# Patient Record
Sex: FEMALE | Race: OTHER | Employment: FULL TIME | URBAN - METROPOLITAN AREA
[De-identification: names, ages, dates, MRNs, and addresses within clinical notes are randomized per-mention and may not be internally consistent; named-entity substitution may affect disease eponyms.]

---

## 2023-12-25 ENCOUNTER — HOSPITAL ENCOUNTER (EMERGENCY)
Facility: HOSPITAL | Age: 53
Discharge: HOME | End: 2023-12-25
Attending: EMERGENCY MEDICINE
Payer: COMMERCIAL

## 2023-12-25 ENCOUNTER — APPOINTMENT (EMERGENCY)
Dept: CT IMAGING | Facility: HOSPITAL | Age: 53
End: 2023-12-25
Payer: COMMERCIAL

## 2023-12-25 VITALS
SYSTOLIC BLOOD PRESSURE: 128 MMHG | RESPIRATION RATE: 18 BRPM | HEART RATE: 78 BPM | OXYGEN SATURATION: 97 % | DIASTOLIC BLOOD PRESSURE: 84 MMHG | WEIGHT: 174.9 LBS | BODY MASS INDEX: 34.16 KG/M2 | TEMPERATURE: 97.8 F

## 2023-12-25 DIAGNOSIS — L03.213 PRESEPTAL CELLULITIS OF LEFT EYE: Primary | ICD-10-CM

## 2023-12-25 DIAGNOSIS — J32.9 SINUSITIS: ICD-10-CM

## 2023-12-25 LAB
ALBUMIN SERPL BCP-MCNC: 4.2 G/DL (ref 3.5–5)
ALP SERPL-CCNC: 107 U/L (ref 34–104)
ALT SERPL W P-5'-P-CCNC: 14 U/L (ref 7–52)
ANION GAP SERPL CALCULATED.3IONS-SCNC: 6 MMOL/L
AST SERPL W P-5'-P-CCNC: 17 U/L (ref 13–39)
BASOPHILS # BLD AUTO: 0.06 THOUSANDS/ÂΜL (ref 0–0.1)
BASOPHILS NFR BLD AUTO: 1 % (ref 0–1)
BILIRUB SERPL-MCNC: 0.33 MG/DL (ref 0.2–1)
BUN SERPL-MCNC: 19 MG/DL (ref 5–25)
CALCIUM SERPL-MCNC: 9.5 MG/DL (ref 8.4–10.2)
CHLORIDE SERPL-SCNC: 103 MMOL/L (ref 96–108)
CO2 SERPL-SCNC: 31 MMOL/L (ref 21–32)
CREAT SERPL-MCNC: 1.07 MG/DL (ref 0.6–1.3)
EOSINOPHIL # BLD AUTO: 0.61 THOUSAND/ÂΜL (ref 0–0.61)
EOSINOPHIL NFR BLD AUTO: 6 % (ref 0–6)
ERYTHROCYTE [DISTWIDTH] IN BLOOD BY AUTOMATED COUNT: 13.1 % (ref 11.6–15.1)
GFR SERPL CREATININE-BSD FRML MDRD: 59 ML/MIN/1.73SQ M
GLUCOSE SERPL-MCNC: 148 MG/DL (ref 65–140)
HCT VFR BLD AUTO: 46.6 % (ref 34.8–46.1)
HGB BLD-MCNC: 14.8 G/DL (ref 11.5–15.4)
IMM GRANULOCYTES # BLD AUTO: 0.02 THOUSAND/UL (ref 0–0.2)
IMM GRANULOCYTES NFR BLD AUTO: 0 % (ref 0–2)
LYMPHOCYTES # BLD AUTO: 2.55 THOUSANDS/ÂΜL (ref 0.6–4.47)
LYMPHOCYTES NFR BLD AUTO: 26 % (ref 14–44)
MCH RBC QN AUTO: 29.7 PG (ref 26.8–34.3)
MCHC RBC AUTO-ENTMCNC: 31.8 G/DL (ref 31.4–37.4)
MCV RBC AUTO: 94 FL (ref 82–98)
MONOCYTES # BLD AUTO: 0.64 THOUSAND/ÂΜL (ref 0.17–1.22)
MONOCYTES NFR BLD AUTO: 7 % (ref 4–12)
NEUTROPHILS # BLD AUTO: 6.02 THOUSANDS/ÂΜL (ref 1.85–7.62)
NEUTS SEG NFR BLD AUTO: 60 % (ref 43–75)
NRBC BLD AUTO-RTO: 0 /100 WBCS
PLATELET # BLD AUTO: 334 THOUSANDS/UL (ref 149–390)
PMV BLD AUTO: 9.8 FL (ref 8.9–12.7)
POTASSIUM SERPL-SCNC: 3.8 MMOL/L (ref 3.5–5.3)
PROT SERPL-MCNC: 6.4 G/DL (ref 6.4–8.4)
RBC # BLD AUTO: 4.98 MILLION/UL (ref 3.81–5.12)
SODIUM SERPL-SCNC: 140 MMOL/L (ref 135–147)
WBC # BLD AUTO: 9.9 THOUSAND/UL (ref 4.31–10.16)

## 2023-12-25 PROCEDURE — 99285 EMERGENCY DEPT VISIT HI MDM: CPT

## 2023-12-25 PROCEDURE — 96375 TX/PRO/DX INJ NEW DRUG ADDON: CPT

## 2023-12-25 PROCEDURE — 96361 HYDRATE IV INFUSION ADD-ON: CPT

## 2023-12-25 PROCEDURE — 99283 EMERGENCY DEPT VISIT LOW MDM: CPT

## 2023-12-25 PROCEDURE — 70481 CT ORBIT/EAR/FOSSA W/DYE: CPT

## 2023-12-25 PROCEDURE — 80053 COMPREHEN METABOLIC PANEL: CPT

## 2023-12-25 PROCEDURE — 36415 COLL VENOUS BLD VENIPUNCTURE: CPT

## 2023-12-25 PROCEDURE — 96374 THER/PROPH/DIAG INJ IV PUSH: CPT

## 2023-12-25 PROCEDURE — G1004 CDSM NDSC: HCPCS

## 2023-12-25 PROCEDURE — 85025 COMPLETE CBC W/AUTO DIFF WBC: CPT

## 2023-12-25 RX ORDER — KETOROLAC TROMETHAMINE 30 MG/ML
15 INJECTION, SOLUTION INTRAMUSCULAR; INTRAVENOUS ONCE
Status: COMPLETED | OUTPATIENT
Start: 2023-12-25 | End: 2023-12-25

## 2023-12-25 RX ORDER — LORAZEPAM 2 MG/ML
1 INJECTION INTRAMUSCULAR ONCE
Status: COMPLETED | OUTPATIENT
Start: 2023-12-25 | End: 2023-12-25

## 2023-12-25 RX ORDER — CLINDAMYCIN HYDROCHLORIDE 300 MG/1
300 CAPSULE ORAL 3 TIMES DAILY
Qty: 21 CAPSULE | Refills: 0 | Status: SHIPPED | OUTPATIENT
Start: 2023-12-25 | End: 2024-01-01

## 2023-12-25 RX ORDER — AMOXICILLIN AND CLAVULANATE POTASSIUM 875; 125 MG/1; MG/1
1 TABLET, FILM COATED ORAL EVERY 12 HOURS SCHEDULED
Qty: 14 TABLET | Refills: 0 | Status: SHIPPED | OUTPATIENT
Start: 2023-12-25 | End: 2024-01-01

## 2023-12-25 RX ORDER — CLINDAMYCIN HYDROCHLORIDE 150 MG/1
300 CAPSULE ORAL ONCE
Status: COMPLETED | OUTPATIENT
Start: 2023-12-25 | End: 2023-12-25

## 2023-12-25 RX ORDER — AMOXICILLIN AND CLAVULANATE POTASSIUM 875; 125 MG/1; MG/1
1 TABLET, FILM COATED ORAL ONCE
Status: COMPLETED | OUTPATIENT
Start: 2023-12-25 | End: 2023-12-25

## 2023-12-25 RX ORDER — TETRACAINE HYDROCHLORIDE 5 MG/ML
1 SOLUTION OPHTHALMIC ONCE
Status: COMPLETED | OUTPATIENT
Start: 2023-12-25 | End: 2023-12-25

## 2023-12-25 RX ADMIN — SODIUM CHLORIDE 1000 ML: 0.9 INJECTION, SOLUTION INTRAVENOUS at 02:28

## 2023-12-25 RX ADMIN — LORAZEPAM 1 MG: 2 INJECTION INTRAMUSCULAR; INTRAVENOUS at 02:57

## 2023-12-25 RX ADMIN — IOHEXOL 100 ML: 350 INJECTION, SOLUTION INTRAVENOUS at 03:17

## 2023-12-25 RX ADMIN — AMOXICILLIN AND CLAVULANATE POTASSIUM 1 TABLET: 875; 125 TABLET, FILM COATED ORAL at 04:33

## 2023-12-25 RX ADMIN — CLINDAMYCIN HYDROCHLORIDE 300 MG: 150 CAPSULE ORAL at 04:33

## 2023-12-25 RX ADMIN — KETOROLAC TROMETHAMINE 15 MG: 30 INJECTION, SOLUTION INTRAMUSCULAR; INTRAVENOUS at 04:39

## 2023-12-25 RX ADMIN — TETRACAINE HYDROCHLORIDE 1 DROP: 5 SOLUTION OPHTHALMIC at 04:45

## 2023-12-25 RX ADMIN — FLUORESCEIN SODIUM 1 STRIP: 1 STRIP OPHTHALMIC at 04:45

## 2023-12-25 NOTE — ED PROVIDER NOTES
History  Chief Complaint   Patient presents with    Allergic Reaction     L eye swelling and redness that began about an hour ago - states it has been watering with some discharge - claims to have allergy to fruits and ate some watermelon and pineapple today     The patient is a 53-year-old female with a past medical history of chronic pansinusitis, nasal polyposis, allergic rhinitis, prediabetes, and hypothyroidism, who presents for evaluation of left eye swelling.  She reports several hours of left eye swelling and redness.  Associated symptoms include eye discharge that is predominantly watery, but occasionally yellow.  No blurred vision, eye pain, or fevers.  The patient does report significant nasal congestion and rhinorrhea recently due to her chronic sinusitis.  She also states that she has allergies to certain fruits and did eat fruit today.  Denies similar reaction in the past.      History provided by:  Patient and relative   used: Yes (Patient's son translated and provided portions of the history.)      Prior to Admission Medications   Prescriptions Last Dose Informant Patient Reported? Taking?   B Complex CAPS   Yes No   Sig: Take 1 capsule by mouth in the morning.   Diclofenac Sodium (VOLTAREN) 1 %   Yes No   Sig: Apply 1 application topically 4 (four) times a day   HYDROcodone-acetaminophen (NORCO) 5-325 mg per tablet   No No   Sig: Take 1 tablet by mouth every 4 (four) hours as needed for pain for up to 20 dosesMax Daily Amount: 6 tablets   Phenir-PE-Sod Sal-Caff Cit (COUGH/COLD MEDICINE PO)   Yes No   Sig: Take 30 mL by mouth as needed   calcium citrate-vitamin D (CITRACAL+D) 315-200 MG-UNIT per tablet   Yes No   Sig: Take 1 tablet by mouth in the morning.   calcium-vitamin D 250-100 MG-UNIT per tablet   Yes No   Sig: Take 1 tablet by mouth 2 (two) times a day   clobetasol (TEMOVATE) 0.05 % cream   Yes No   Sig: apply to affected area twice a day for 10 days   cyanocobalamin 1,000  mcg/mL   Yes No   Sig: Inject 1,000 mcg into a muscle every 30 (thirty) days   ergocalciferol (VITAMIN D2) 50,000 units   Yes No   Sig: Take 50,000 Units by mouth once a week   famotidine (PEPCID) 20 mg tablet   No No   Sig: Take 1 tablet (20 mg total) by mouth 2 (two) times a day   Patient not taking: No sig reported   fluticasone (FLONASE) 50 mcg/act nasal spray   No No   Si sprays into each nostril 2 (two) times a day   Patient not taking: No sig reported   fluticasone (FLONASE) 50 mcg/act nasal spray   No No   Si sprays into each nostril 2 (two) times a day   Patient not taking: No sig reported   fluticasone (FLONASE) 50 mcg/act nasal spray   No No   Sig: INSTILL 2 SPRAYS INTO EACH NOSTRIL IN THE MORNING AND EVENING   gabapentin (NEURONTIN) 100 mg capsule   Yes No   levothyroxine 50 mcg tablet   No No   Sig: take 1 tablet by mouth every morning ON AN EMPTY STOMACH   loratadine (CLARITIN) 10 mg tablet  Self Yes No   Sig: Take 10 mg by mouth daily as needed     loratadine (CLARITIN) 10 mg tablet   No No   Sig: Take 1 tablet (10 mg total) by mouth daily   Patient not taking: No sig reported   loratadine (CLARITIN) 10 mg tablet   No No   Sig: Take 1 tablet (10 mg total) by mouth in the morning.   melatonin 3 mg   Yes No   Sig: Take 3 mg by mouth daily at bedtime   montelukast (SINGULAIR) 10 mg tablet   Yes No   Sig: Take 10 mg by mouth daily at bedtime   Patient not taking: No sig reported   montelukast (SINGULAIR) 10 mg tablet   No No   Sig: Take 1 tablet (10 mg total) by mouth daily at bedtime   predniSONE 10 mg tablet   No No   Si tabls PO in morning  days 1-3, 4 tabls days 4-6, 3 tabls PO days 7-9, 2 tabls days 10-12, 1 tabl PO in morning  days 13-15   Patient not taking: No sig reported   predniSONE 10 mg tablet   No No   Sig: Take 5 tablets (50 mg total) by mouth daily after breakfast 5 tabls PO in morning  days 1-3, 4 tabl days 4-6, 3 tabls days 7-9, 2 tabls days 10-12, 1 tabl PO in morning  days  13-15   sodium chloride (OCEAN) 0.65 % nasal spray   No No   Si sprays into each nostril 60 minutes pre-procedure   thiamine (VITAMIN B1) 100 mg tablet   Yes No   Sig: Take 100 mg by mouth daily      Facility-Administered Medications: None       Past Medical History:   Diagnosis Date    Hypothyroidism     Obesity     Vitamin D deficiency        Past Surgical History:   Procedure Laterality Date     SECTION  01/10/2000    NASAL SINUS SURGERY      ID STRTCTC CPTR ASSTD PX EXTRADURAL CRANIAL N/A 2020    Procedure: FUNCTIONAL ENDOSCOPIC SINUS SURGERY (FESS) IMAGED GUIDED, MAXILLARY ANTROSTOMY, TOTAL ETHMOIDECTOMY, SPHENOIDOTOMY,FRONTAL SINUSOTOMY;  Surgeon: Jatin Mejia MD;  Location: [facility];  Service: ENT    TUBAL LIGATION Bilateral        Family History   Problem Relation Age of Onset    Prostate cancer Father     Hypertension Mother     Diabetes Mother     Heart disease Mother     No Known Problems Sister     No Known Problems Sister     No Known Problems Sister     No Known Problems Sister      I have reviewed and agree with the history as documented.    E-Cigarette/Vaping    E-Cigarette Use Never User      E-Cigarette/Vaping Substances    Nicotine No     THC No     CBD No     Flavoring No     Other No     Unknown No      Social History     Tobacco Use    Smoking status: Never    Smokeless tobacco: Never   Vaping Use    Vaping status: Never Used   Substance Use Topics    Alcohol use: Never    Drug use: Never       Review of Systems   Constitutional:  Negative for chills and fever.   HENT:  Positive for congestion, facial swelling and rhinorrhea. Negative for ear pain and sore throat.    Eyes:  Positive for discharge and redness. Negative for photophobia, pain, itching and visual disturbance.   Respiratory:  Negative for cough and shortness of breath.    Cardiovascular:  Negative for chest pain and palpitations.   Gastrointestinal:  Negative for abdominal pain and vomiting.   Genitourinary:   Negative for dysuria and hematuria.   Musculoskeletal:  Negative for arthralgias, back pain and myalgias.   Skin:  Negative for color change and rash.   Neurological:  Negative for seizures, syncope and headaches.   All other systems reviewed and are negative.      Physical Exam  Physical Exam  Vitals and nursing note reviewed.   Constitutional:       General: She is awake. She is not in acute distress.     Appearance: Normal appearance. She is well-developed.   HENT:      Head: Normocephalic and atraumatic. Left periorbital erythema present. No right periorbital erythema.      Right Ear: External ear normal.      Left Ear: External ear normal.      Nose: Mucosal edema present.      Right Sinus: No maxillary sinus tenderness or frontal sinus tenderness.      Left Sinus: No maxillary sinus tenderness or frontal sinus tenderness.      Mouth/Throat:      Lips: Pink.      Mouth: Mucous membranes are moist.      Pharynx: Oropharynx is clear. Uvula midline.   Eyes:      General: Lids are normal. Vision grossly intact. Gaze aligned appropriately. No visual field deficit.        Right eye: No hordeolum.         Left eye: No hordeolum.      Extraocular Movements: Extraocular movements intact.      Conjunctiva/sclera:      Right eye: Right conjunctiva is not injected. No chemosis, exudate or hemorrhage.     Left eye: Left conjunctiva is injected. Exudate present. No hemorrhage.     Pupils: Pupils are equal, round, and reactive to light.      Left eye: No corneal abrasion or fluorescein uptake.      Slit lamp exam:     Left eye: Anterior chamber quiet. No corneal ulcer, foreign body or photophobia.      Comments: Mild to moderate left periorbital swelling and erythema.  The area is minimally tender to palpation.  The left conjunctiva is injected and there is a scant amount of mucopurulent discharge.   Cardiovascular:      Rate and Rhythm: Normal rate and regular rhythm.   Pulmonary:      Effort: Pulmonary effort is normal. No  respiratory distress.   Musculoskeletal:      Cervical back: Normal, full passive range of motion without pain and neck supple.      Thoracic back: Normal.      Lumbar back: Normal.   Lymphadenopathy:      Cervical: No cervical adenopathy.   Skin:     General: Skin is warm.      Capillary Refill: Capillary refill takes less than 2 seconds.      Coloration: Skin is not pale.      Findings: Erythema present. No rash.   Neurological:      Mental Status: She is alert and oriented to person, place, and time.   Psychiatric:         Behavior: Behavior is cooperative.         Vital Signs  ED Triage Vitals   Temperature Pulse Respirations Blood Pressure SpO2   12/25/23 0155 12/25/23 0155 12/25/23 0155 12/25/23 0155 12/25/23 0155   97.8 °F (36.6 °C) 91 18 152/93 97 %      Temp Source Heart Rate Source Patient Position - Orthostatic VS BP Location FiO2 (%)   12/25/23 0155 12/25/23 0155 12/25/23 0155 12/25/23 0155 --   Tympanic Monitor Lying Left arm       Pain Score       12/25/23 0439       8           Vitals:    12/25/23 0155 12/25/23 0432   BP: 152/93 128/84   Pulse: 91 78   Patient Position - Orthostatic VS: Lying Lying         ED Medications  Medications   sodium chloride 0.9 % bolus 1,000 mL (0 mL Intravenous Stopped 12/25/23 0328)   LORazepam (ATIVAN) injection 1 mg (1 mg Intravenous Given 12/25/23 0257)   iohexol (OMNIPAQUE) 350 MG/ML injection (MULTI-DOSE) 100 mL (100 mL Intravenous Given 12/25/23 0317)   clindamycin (CLEOCIN) capsule 300 mg (300 mg Oral Given 12/25/23 0433)   amoxicillin-clavulanate (AUGMENTIN) 875-125 mg per tablet 1 tablet (1 tablet Oral Given 12/25/23 0433)   ketorolac (TORADOL) injection 15 mg (15 mg Intravenous Given 12/25/23 0439)   fluorescein sodium sterile ophthalmic strip 1 strip (1 strip Left Eye Given by Other 12/25/23 0445)   tetracaine 0.5 % ophthalmic solution 1 drop (1 drop Left Eye Given by Other 12/25/23 0445)       Diagnostic Studies  Results Reviewed       Procedure Component  Value Units Date/Time    Comprehensive metabolic panel [[id number]]  (Abnormal) Collected: 12/25/23 0227    Lab Status: Final result Specimen: Blood from Arm, Right Updated: 12/25/23 0251     Sodium 140 mmol/L      Potassium 3.8 mmol/L      Chloride 103 mmol/L      CO2 31 mmol/L      ANION GAP 6 mmol/L      BUN 19 mg/dL      Creatinine 1.07 mg/dL      Glucose 148 mg/dL      Calcium 9.5 mg/dL      AST 17 U/L      ALT 14 U/L      Alkaline Phosphatase 107 U/L      Total Protein 6.4 g/dL      Albumin 4.2 g/dL      Total Bilirubin 0.33 mg/dL      eGFR 59 ml/min/1.73sq m     Narrative:      National [employer] guidelines for Chronic Kidney Disease (CKD):     Stage 1 with normal or high GFR (GFR > 90 mL/min/1.73 square meters)    Stage 2 Mild CKD (GFR = 60-89 mL/min/1.73 square meters)    Stage 3A Moderate CKD (GFR = 45-59 mL/min/1.73 square meters)    Stage 3B Moderate CKD (GFR = 30-44 mL/min/1.73 square meters)    Stage 4 Severe CKD (GFR = 15-29 mL/min/1.73 square meters)    Stage 5 End Stage CKD (GFR <15 mL/min/1.73 square meters)  Note: GFR calculation is accurate only with a steady state creatinine    CBC and differential [[id number]]  (Abnormal) Collected: 12/25/23 0227    Lab Status: Final result Specimen: Blood from Arm, Right Updated: 12/25/23 0235     WBC 9.90 Thousand/uL      RBC 4.98 Million/uL      Hemoglobin 14.8 g/dL      Hematocrit 46.6 %      MCV 94 fL      MCH 29.7 pg      MCHC 31.8 g/dL      RDW 13.1 %      MPV 9.8 fL      Platelets 334 Thousands/uL      nRBC 0 /100 WBCs      Neutrophils Relative 60 %      Immat GRANS % 0 %      Lymphocytes Relative 26 %      Monocytes Relative 7 %      Eosinophils Relative 6 %      Basophils Relative 1 %      Neutrophils Absolute 6.02 Thousands/µL      Immature Grans Absolute 0.02 Thousand/uL      Lymphocytes Absolute 2.55 Thousands/µL      Monocytes Absolute 0.64 Thousand/µL      Eosinophils Absolute 0.61 Thousand/µL      Basophils Absolute 0.06  Thousands/µL                    CT orbits/temporal bones/skull base w contrast   Final Result by E. Alec Schoenberger, MD (12/25 0513)      No postseptal inflammatory changes.   Mild left preseptal soft tissue edema, correlate for cellulitis. No abscess.   Worsening pansinus mucosal thickening.      Findings concurrent with preliminary report provided by [facility] at 4:17 a.m.      Workstation performed: [id number]                    Procedures  Procedures         ED Course  ED Course as of 12/25/23 0600   Mon Dec 25, 2023   0235 WBC: 9.90   0421 Preliminary CT Read per Dr. [facility]:  No evidence of post septal cellulitis or collection.  Severe paranasal sinus mucosal thickening.           Medical Decision Making  Patient presents with left eye swelling and redness.  On exam there is erythema, swelling, and mild tenderness of the left periorbital region.  EOMI and there is no pain with eye movement.  Vision is intact.  No abnormalities visualized with fluorescein staining.  Differential diagnosis includes but is not limited to preseptal cellulitis, postseptal/orbital cellulitis, conjunctivitis, iritis, uveitis, corneal foreign body, corneal abrasion, or allergic reaction.  CT of the abdomen and pelvis revealed mild left preseptal soft tissue swelling, consistent with cellulitis, but no evidence of postseptal cellulitis.  Will treat with a course of clindamycin and Augmentin.  Reviewed results with the patient and all questions were answered to the best my ability.  Strict return precautions discussed and she verbalized understanding.  Follow-up with PCP and ophthalmology, but return to the ED in the interim with new or worsening symptoms.        Problems Addressed:  Preseptal cellulitis of left eye: acute illness or injury  Sinusitis: acute illness or injury    Amount and/or Complexity of Data Reviewed  External Data Reviewed: notes.  Labs: ordered. Decision-making details documented in ED  Course.  Radiology: ordered.    Risk  Prescription drug management.             Disposition  Final diagnoses:   Preseptal cellulitis of left eye   Sinusitis     Time reflects when diagnosis was documented in both MDM as applicable and the Disposition within this note       Time User Action Codes Description Comment    12/25/2023  4:20 AM Sarah Roman [L03.213] Preseptal cellulitis of left eye     12/25/2023  4:24 AM Sarah Roman [J32.9] Sinusitis           ED Disposition       ED Disposition   Discharge    Condition   Stable    Date/Time   Mon Dec 25, 2023  4:20 AM    Comment   Dinorah Sheriff discharge to home/self care.                   Follow-up Information       Follow up With Specialties Details Why Contact Info    [facility]   [address]  [address]  [place] [address]  [phone]      Your primary care provider    [facility]   [address]   [place] [id number] [phone]            Discharge Medication List as of 12/25/2023  4:25 AM        START taking these medications    Details   amoxicillin-clavulanate (AUGMENTIN) 875-125 mg per tablet Take 1 tablet by mouth every 12 (twelve) hours for 7 days, Starting Mon 12/25/2023, Until Mon 1/1/2024, Print      clindamycin (CLEOCIN) 300 MG capsule Take 1 capsule (300 mg total) by mouth 3 (three) times a day for 7 days, Starting Mon 12/25/2023, Until Mon 1/1/2024, Print           CONTINUE these medications which have NOT CHANGED    Details   B Complex CAPS Take 1 capsule by mouth in the morning., Starting Wed 3/23/2022, Historical Med      calcium citrate-vitamin D (CITRACAL+D) 315-200 MG-UNIT per tablet Take 1 tablet by mouth in the morning., Starting Thu 3/24/2022, Historical Med      calcium-vitamin D 250-100 MG-UNIT per tablet Take 1 tablet by mouth 2 (two) times a day, Historical Med      clobetasol (TEMOVATE) 0.05 % cream apply to affected area twice a day for 10 days, Historical Med       cyanocobalamin 1,000 mcg/mL Inject 1,000 mcg into a muscle every 30 (thirty) days, Starting Thu 3/24/2022, Until Tue 9/20/2022, Historical Med      Diclofenac Sodium (VOLTAREN) 1 % Apply 1 application topically 4 (four) times a day, Starting Tue 9/7/2021, Until Wed 9/7/2022, Historical Med      ergocalciferol (VITAMIN D2) 50,000 units Take 50,000 Units by mouth once a week, Starting Wed 3/23/2022, Historical Med      famotidine (PEPCID) 20 mg tablet Take 1 tablet (20 mg total) by mouth 2 (two) times a day, Starting Thu 11/5/2020, Normal      !! fluticasone (FLONASE) 50 mcg/act nasal spray 2 sprays into each nostril 2 (two) times a day, Starting Thu 8/20/2020, Normal      !! fluticasone (FLONASE) 50 mcg/act nasal spray 2 sprays into each nostril 2 (two) times a day, Starting Thu 2/11/2021, Normal      !! fluticasone (FLONASE) 50 mcg/act nasal spray INSTILL 2 SPRAYS INTO EACH NOSTRIL IN THE MORNING AND EVENING, Normal      gabapentin (NEURONTIN) 100 mg capsule Starting Mon 5/16/2022, Historical Med      HYDROcodone-acetaminophen (NORCO) 5-325 mg per tablet Take 1 tablet by mouth every 4 (four) hours as needed for pain for up to 20 dosesMax Daily Amount: 6 tablets, Starting Mon 1/27/2020, Normal      levothyroxine 50 mcg tablet take 1 tablet by mouth every morning ON AN EMPTY STOMACH, Normal      !! loratadine (CLARITIN) 10 mg tablet Take 10 mg by mouth daily as needed  , Starting Tue 2/14/2017, Historical Med      !! loratadine (CLARITIN) 10 mg tablet Take 1 tablet (10 mg total) by mouth daily, Starting Thu 8/20/2020, Normal      !! loratadine (CLARITIN) 10 mg tablet Take 1 tablet (10 mg total) by mouth in the morning., Starting Thu 5/19/2022, Normal      melatonin 3 mg Take 3 mg by mouth daily at bedtime, Starting Wed 3/23/2022, Historical Med      !! montelukast (SINGULAIR) 10 mg tablet Take 10 mg by mouth daily at bedtime, Historical Med      !! montelukast (SINGULAIR) 10 mg tablet Take 1 tablet (10 mg total) by  mouth daily at bedtime, Starting Thu 5/19/2022, Normal      Phenir-PE-Sod Sal-Caff Cit (COUGH/COLD MEDICINE PO) Take 30 mL by mouth as needed, Historical Med      !! predniSONE 10 mg tablet 5 tabls PO in morning  days 1-3, 4 tabls days 4-6, 3 tabls PO days 7-9, 2 tabls days 10-12, 1 tabl PO in morning  days 13-15, Normal      !! predniSONE 10 mg tablet Take 5 tablets (50 mg total) by mouth daily after breakfast 5 tabls PO in morning  days 1-3, 4 tabl days 4-6, 3 tabls days 7-9, 2 tabls days 10-12, 1 tabl PO in morning  days 13-15, Starting Thu 5/19/2022, Normal      sodium chloride (OCEAN) 0.65 % nasal spray 2 sprays into each nostril 60 minutes pre-procedure, Starting Mon 1/27/2020, Until Wed 2/26/2020, Normal      thiamine (VITAMIN B1) 100 mg tablet Take 100 mg by mouth daily, Historical Med       !! - Potential duplicate medications found. Please discuss with provider.          No discharge procedures on file.    PDMP Review         Value Time User    PDMP Reviewed  Yes 1/27/2020 11:04 AM Jatin Mejia MD            ED Provider  Electronically Signed by             Sarah Roman PA-C  12/25/23 0794

## 2024-02-05 ENCOUNTER — TELEPHONE (OUTPATIENT)
Age: 54
End: 2024-02-05

## 2024-02-05 NOTE — TELEPHONE ENCOUNTER
Patient scheduled for 5/9/24 with Dr. Mejia to follow up nasal polyp. Patient states she cannot wait that long because she has difficulty breathing. The patient is not available until 1:00 p.m. or later.     Advised that we will place her on the cancellation list and will call as soon as we have an opening.

## 2024-03-14 ENCOUNTER — OFFICE VISIT (OUTPATIENT)
Dept: OTOLARYNGOLOGY | Facility: CLINIC | Age: 54
End: 2024-03-14
Payer: COMMERCIAL

## 2024-03-14 VITALS
TEMPERATURE: 98 F | WEIGHT: 174 LBS | HEART RATE: 80 BPM | HEIGHT: 62 IN | OXYGEN SATURATION: 97 % | BODY MASS INDEX: 32.02 KG/M2

## 2024-03-14 DIAGNOSIS — J33.9 NASAL POLYPOSIS: Primary | ICD-10-CM

## 2024-03-14 DIAGNOSIS — J32.4 CHRONIC PANSINUSITIS: ICD-10-CM

## 2024-03-14 DIAGNOSIS — R43.8 HYPOSMIA: ICD-10-CM

## 2024-03-14 DIAGNOSIS — J30.1 SEASONAL ALLERGIC RHINITIS DUE TO POLLEN: ICD-10-CM

## 2024-03-14 PROCEDURE — 99214 OFFICE O/P EST MOD 30 MIN: CPT | Performed by: OTOLARYNGOLOGY

## 2024-03-14 RX ORDER — PREDNISONE 10 MG/1
50 TABLET ORAL
Qty: 45 TABLET | Refills: 0 | Status: SHIPPED | OUTPATIENT
Start: 2024-03-14

## 2024-03-14 RX ORDER — SULFAMETHOXAZOLE AND TRIMETHOPRIM 800; 160 MG/1; MG/1
1 TABLET ORAL EVERY 12 HOURS SCHEDULED
Qty: 20 TABLET | Refills: 0 | Status: SHIPPED | OUTPATIENT
Start: 2024-03-14 | End: 2024-03-24

## 2024-03-14 RX ORDER — FLUTICASONE PROPIONATE 50 MCG
2 SPRAY, SUSPENSION (ML) NASAL 2 TIMES DAILY
Qty: 32 ML | Refills: 2 | Status: SHIPPED | OUTPATIENT
Start: 2024-03-14

## 2024-03-14 NOTE — PROGRESS NOTES
Dinorah Sheriff 53 y.o. female MRN: [id number]  Unit/Bed#:  Encounter: [id number]            History of Present Illness     Reason for Visit:: History of nasal polyposis/sinusitis    HPI: Dinorah Sheriff is a 53 y.o. year old female  status post functional endoscopic sinus surgery image guided all sinuses (17 and 2020).   Patient had initially recovered a fair amount of sense smell but 2022 visit had developed mild hyposmia.  I gave her a new treatment with oral antibiotic, oral steroid taper and instructed to continue the fluticasone b.i.d. and budesonide sinus rinses with partial recovery of sense of smell.  On last visit very small polyps were noticed on the roof of the ethmoid.  She was then instructed to continue the budesonide rinses, she has not followed since 2022.  Now again with nasal congestion and anosmia.  On 2023 she had apparently conjunctivitis and preseptal cellulitis, was seen in the emergency room and a CT scan of the orbits was done.  Left periorbital preseptal soft tissue edema was noticed, in addition to complete opacification of all the sinuses.  She did not tolerate the antibiotic that was prescribed orally, was treated only with eyedrops    Review of Systems   All other systems reviewed and are negative.    Revision of Systems:    Complete review done, only positive for the symptoms described in the H&P section above      Historical Information   Past Medical History:   Diagnosis Date    Hypothyroidism     Obesity     Vitamin D deficiency      Past Surgical History:   Procedure Laterality Date     SECTION  01/10/2000    NASAL SINUS SURGERY      [facility] CPTR ASSTD PX EXTRADURAL CRANIAL N/A 2020    Procedure: FUNCTIONAL ENDOSCOPIC SINUS SURGERY (FESS) IMAGED GUIDED, MAXILLARY ANTROSTOMY, TOTAL ETHMOIDECTOMY, SPHENOIDOTOMY,FRONTAL SINUSOTOMY;  Surgeon: Jatin Mejia MD;  Location: [facility];  Service: ENT    TUBAL LIGATION Bilateral   "    Social History   Social History     Substance and Sexual Activity   Alcohol Use Never     Social History     Substance and Sexual Activity   Drug Use Never     Social History     Tobacco Use   Smoking Status Never   Smokeless Tobacco Never     Family History:   Family History   Problem Relation Age of Onset    Prostate cancer Father     Hypertension Mother     Diabetes Mother     Heart disease Mother     No Known Problems Sister     No Known Problems Sister     No Known Problems Sister     No Known Problems Sister        Meds/Allergies   No current facility-administered medications for this visit.         Allergies   Allergen Reactions    Cephalexin Rash       Objective       Physical Exam   Pulse 80, temperature 98 °F (36.7 °C), temperature source Temporal, height 5' 2\" (1.575 m), weight 78.9 kg (174 lb), SpO2 97%, not currently breastfeeding.  Constitutional: Oriented to person, place, and time. Well-developed and well-nourished, no apparent distress, non-toxic appearance. Cooperative, able to hear and answer questions without difficulty.      Voice: Normal voice quality.  Head: Normocephalic, atraumatic.  No scars, masses or lesions.  Face: Symmetric, no edema, no sinus tenderness.  Eyes: Vision grossly intact, extra-ocular movement intact.  Right Ear: External ear normal.  Auditory canal clear.  Tympanic membrane well-appearing, without retraction or scarring.  No fluid present. No post-auricular erythema or tenderness  Left Ear: External ear normal.  Auditory canal clear.  Tympanic membrane well-appearing, without retraction or scarring.  No fluid present.  No post-auricular erythema or tenderness.  Nose: Septum midline, Mucosa moist, turbinates normal size, no edema.  Polyps are evident, filling completely the middle meatus and partially extending into the nasal cavity bilaterally.    Oral cavity:  Lips normal, no mucosal lesions. Dentition intact, gingiva normal in appearance. Mucosa moist,  Tongue " "mobile, floor of mouth normal.  Hard palate unremarkable.  No masses or lesions.   Oropharynx: Uvula is midline, sot palate normal.  Unremarkable oropharyngeal inlet.  Tonsils unremarkable.  Posterior pharyngeal wall clear. No masses or lesions.  Salivary glands:  Parotid glands and submandibular glands symmetric, no enlargement or tenderness.  Neck: Normal laryngeal elevation with swallow.  Trachea midline.  No masses or lesions. No palpable adenopathy.  Thyroid: normal in size, and consistency, unremarkable without tenderness or palpable nodules.  Pulmonary/Chest: Normal effort and rate. No respiratory distress.   Musculoskeletal: Normal range of motion.   Neurological: Cranial nerves 2-12 intact.  Skin: Skin is warm and dry.   Psychiatric: Normal mood and affect.    CT scan orbits: 12/25/2023  Complete opacification of all the sinuses, nasal cavity still patent, there are evident changes of prior sinus surgery.  No erosion of the bone on the lamina Papyracea or base of skull.    Lab Results: CBC: No results found for: \"WBC\", \"HGB\", \"HCT\", \"MCV\", \"PLT\", \"ADJUSTEDWBC\", \"RBC\", \"MCH\", \"MCHC\", \"RDW\", \"MPV\", \"NRBC\", CMP: No results found for: \"NA\", \"K\", \"CL\", \"CO2\", \"ANIONGAP\", \"BUN\", \"CREATININE\", \"GLUCOSE\", \"CALCIUM\", \"AST\", \"ALT\", \"ALKPHOS\", \"PROT\", \"BILITOT\", \"EGFR\"      Assessment:  No diagnosis found.    Recurrence of polyps on patient with 2 prior surgeries, 2017 and 2020.  I did review with the patient the nature of the polyposis being it a very chronic condition that needs continued follow-up.  Will prescribe a steroid taper as well as restart on the budesonide rinses.  Will also prescribe Bactrim and Flonase.  Depending on response may need additional surgery this was also reviewed with the patient.  In addition he may eventually qualify for Dupixent or Nucala,   Of note she does have on the records allergy to cephalexin.  Most recently was giving Augmentin and she reports that she had very severe rash in the " skin with that antibiotic as well.    Request a CT scan of the sinuses posttreatment with image guided protocol.  The last CT scan was only orbits and I reviewed the images, they do not have the protocol needed for image guidance.

## 2024-03-20 ENCOUNTER — TELEPHONE (OUTPATIENT)
Age: 54
End: 2024-03-20

## 2024-03-20 NOTE — TELEPHONE ENCOUNTER
Patient called to schedule f/u with Dr. Mejia after her CT this Friday. The only thing available is September. Patient was upset and said she cannot wait that long because the doctor ordered this test as urgent. Please call the patient with an appointment asap. Thank you.

## 2024-03-22 ENCOUNTER — HOSPITAL ENCOUNTER (OUTPATIENT)
Dept: CT IMAGING | Facility: HOSPITAL | Age: 54
End: 2024-03-22
Attending: OTOLARYNGOLOGY
Payer: COMMERCIAL

## 2024-03-22 DIAGNOSIS — J32.4 CHRONIC PANSINUSITIS: ICD-10-CM

## 2024-03-22 DIAGNOSIS — J30.1 SEASONAL ALLERGIC RHINITIS DUE TO POLLEN: ICD-10-CM

## 2024-03-22 DIAGNOSIS — J33.9 NASAL POLYPOSIS: ICD-10-CM

## 2024-03-22 DIAGNOSIS — R43.8 HYPOSMIA: ICD-10-CM

## 2024-03-22 PROCEDURE — 70486 CT MAXILLOFACIAL W/O DYE: CPT

## 2024-04-05 ENCOUNTER — NURSE TRIAGE (OUTPATIENT)
Dept: OTOLARYNGOLOGY | Facility: CLINIC | Age: 54
End: 2024-04-05

## 2024-04-05 NOTE — TELEPHONE ENCOUNTER
"Pt of Dr. Mejia.    Pt calling -  RN connected  to call- [place] [id number].  Pt asking if she has a follow up appt scheduled to discuss her CT results.  RN advised that pt's appt is on 4/18/24 at 1130.  Pt verbalizes understanding and has no other questions or concerns.    Answer Assessment - Initial Assessment Questions  1. REASON FOR CALL or QUESTION: \"What is your reason for calling today?\" or \"How can I best help you?\" or \"What question do you have that I can help answer?\"      Pt calling to check if she has an appt.    Protocols used: Information Only Call - No Triage-ADULT-[place]    "

## 2024-04-18 ENCOUNTER — OFFICE VISIT (OUTPATIENT)
Dept: OTOLARYNGOLOGY | Facility: CLINIC | Age: 54
End: 2024-04-18
Payer: COMMERCIAL

## 2024-04-18 DIAGNOSIS — J33.9 NASAL POLYPOSIS: ICD-10-CM

## 2024-04-18 DIAGNOSIS — J30.1 SEASONAL ALLERGIC RHINITIS DUE TO POLLEN: ICD-10-CM

## 2024-04-18 DIAGNOSIS — J32.4 CHRONIC PANSINUSITIS: Primary | ICD-10-CM

## 2024-04-18 PROCEDURE — 99213 OFFICE O/P EST LOW 20 MIN: CPT | Performed by: OTOLARYNGOLOGY

## 2024-04-18 PROCEDURE — 31231 NASAL ENDOSCOPY DX: CPT | Performed by: OTOLARYNGOLOGY

## 2024-04-18 NOTE — PROGRESS NOTES
Dinorah Sheriff 53 y.o. female MRN: [id number]  Unit/Bed#:  Encounter: [id number]            History of Present Illness     Reason for Visit:: History of nasal polyposis/sinusitis    HPI: Dinorah Sheriff is a 53 y.o. year old female  status post functional endoscopic sinus surgery image guided all sinuses (1/19/17 and 01/27/2020).   Patient had initially recovered a fair amount of sense smell but 7/2022 visit had developed mild hyposmia.  I gave her a new treatment with oral antibiotic, oral steroid taper and instructed to continue the fluticasone b.i.d. and budesonide sinus rinses with partial recovery of sense of smell.  On last visit very small polyps were noticed on the roof of the ethmoid.  She was then instructed to continue the budesonide rinses.  she has not followed since 7/14/2022.  On 12/25/2023 she had apparently conjunctivitis and preseptal cellulitis, was seen in the emergency room and a CT scan of the orbits was done.  Left periorbital preseptal soft tissue edema was noticed, in addition to complete opacification of all the sinuses.  She did not tolerate the antibiotic that was prescribed orally, was treated only with eyedrops  She returned to our office on March 2024 with nasal congestion and anosmia, at that point she was noticed to have polyps on her nasal cavities.  I prescribed an oral steroid taper, a course of antibiotics and instructed patient to start budesonide rinses and continue nasal steroid spray.  She reports that with the treatment there was some improvement with recovery of sense of smell and less congestion.  A new CT scan was requested.        Review of Systems   All other systems reviewed and are negative.    Revision of Systems:    Complete review done, only positive for the symptoms described in the H&P section above      Historical Information   Past Medical History:   Diagnosis Date    Hypothyroidism     Obesity     Vitamin D deficiency      Past Surgical History:    Procedure Laterality Date     SECTION  01/10/2000    NASAL SINUS SURGERY      [facility] CPTR ASSTD PX EXTRADURAL CRANIAL N/A 2020    Procedure: FUNCTIONAL ENDOSCOPIC SINUS SURGERY (FESS) IMAGED GUIDED, MAXILLARY ANTROSTOMY, TOTAL ETHMOIDECTOMY, SPHENOIDOTOMY,FRONTAL SINUSOTOMY;  Surgeon: Jatin Mejia MD;  Location: [facility];  Service: ENT    TUBAL LIGATION Bilateral      Social History   Social History     Substance and Sexual Activity   Alcohol Use Never     Social History     Substance and Sexual Activity   Drug Use Never     Social History     Tobacco Use   Smoking Status Never   Smokeless Tobacco Never     Family History:   Family History   Problem Relation Age of Onset    Prostate cancer Father     Hypertension Mother     Diabetes Mother     Heart disease Mother     No Known Problems Sister     No Known Problems Sister     No Known Problems Sister     No Known Problems Sister        Meds/Allergies   No current facility-administered medications for this visit.         Allergies   Allergen Reactions    Cephalexin Rash       Objective       Physical Exam   not currently breastfeeding.  Constitutional: Oriented to person, place, and time. Well-developed and well-nourished, no apparent distress, non-toxic appearance. Cooperative, able to hear and answer questions without difficulty.      Voice: Normal voice quality.  Head: Normocephalic, atraumatic.  No scars, masses or lesions.  Face: Symmetric, no edema, no sinus tenderness.  Eyes: Vision grossly intact, extra-ocular movement intact.  Right Ear: External ear normal.  Auditory canal clear.  Tympanic membrane well-appearing, without retraction or scarring.  No fluid present. No post-auricular erythema or tenderness  Left Ear: External ear normal.  Auditory canal clear.  Tympanic membrane well-appearing, without retraction or scarring.  No fluid present.  No post-auricular erythema or tenderness.  Nose: Septum midline, Mucosa moist, turbinates  "normal size, no edema. No polyps are evident, no discharge   Oral cavity:  Lips normal, no mucosal lesions. Dentition intact, gingiva normal in appearance. Mucosa moist,  Tongue mobile, floor of mouth normal.  Hard palate unremarkable.  No masses or lesions.   Oropharynx: Uvula is midline, sot palate normal.  Unremarkable oropharyngeal inlet.  Tonsils unremarkable.  Posterior pharyngeal wall clear. No masses or lesions.  Salivary glands:  Parotid glands and submandibular glands symmetric, no enlargement or tenderness.  Neck: Normal laryngeal elevation with swallow.  Trachea midline.  No masses or lesions. No palpable adenopathy.  Thyroid: normal in size, and consistency, unremarkable without tenderness or palpable nodules.  Pulmonary/Chest: Normal effort and rate. No respiratory distress.   Musculoskeletal: Normal range of motion.   Neurological: Cranial nerves 2-12 intact.  Skin: Skin is warm and dry.   Psychiatric: Normal mood and affect.    CT scan orbits: 12/25/2023  Complete opacification of all the sinuses, nasal cavity still patent, there are evident changes of prior sinus surgery.  No erosion of the bone on the lamina Papyracea or base of skull.  CT scan sinuses 3/22/2024 (after steroid taper and antibiotic) status post sinus surgery with significant improvement compared to the CT scan from December 2023.  There is still mucosal thickening on maxillary sinuses, more polypoid tissue on the right side compared to the left, in a similar fashion the right frontal sinus has more polypoid edema or opacification near the outflow track as compared to the left frontal sinus.    Lab Results: CBC: No results found for: \"WBC\", \"HGB\", \"HCT\", \"MCV\", \"PLT\", \"ADJUSTEDWBC\", \"RBC\", \"MCH\", \"MCHC\", \"RDW\", \"MPV\", \"NRBC\", CMP: No results found for: \"NA\", \"K\", \"CL\", \"CO2\", \"ANIONGAP\", \"BUN\", \"CREATININE\", \"GLUCOSE\", \"CALCIUM\", \"AST\", \"ALT\", \"ALKPHOS\", \"PROT\", \"BILITOT\", \"EGFR\"    Nasal endoscopy:     Verbal informed consent " obtained  Topical anesthesia and vasoconstriction was applied via nasal spray bilaterally. Once anesthesia vasoconstriction had taken effect a flexible endoscope was advanced on both nasal cavities to the level of the nasopharynx with the findings described below:      The endoscope was then removed without complication patient tolerates procedure.  Endoscopy reveals nasal cavity is completely patent, endoscope was advanced on left middle meatus into the ethmoid cavity.  Most of the ethmoid cavity has relatively normal-appearing mucosa, there is some polypoid mucosa towards the medial side of the ethmoid.  Also some polypoid mucosa can be seen on the maxillary sinus.  No active discharge noticed at time of the exam.  Endoscope was then advanced on the right nasal cavity.  No polyps into the nasal cavity.  The endoscope was advanced behind the middle turbinate then the ethmoid is seen partially filled with multiple 4 mm polyps towards the roof most significantly.  There is no active discharge, no crusting, polyps also present at the area of the antrostomy as well as towards the frontal recess area.    Assessment:  No diagnosis found.    Recurrence of polyps on patient with 2 prior surgeries, 2017 and 2020.  I did review with the patient the nature of the polyposis being it a very chronic condition that needs continued follow-up.  Overall very good response both symptomatically and endoscopically with the steroid taper treatment.  On the last note I had mentioned budesonide rinses but apparently never called by the pharmacy.  She is only doing saline rinses.  Will prescribe Mometasone rinses.  Will also prescribe Bactrim and Flonase.  In addition she may eventually qualify for Dupixent or Nucala,   Of note she does have on the records allergy to cephalexin, with recent Augmentin she reports that having also severe rash in the skin

## 2024-05-27 ENCOUNTER — HOSPITAL ENCOUNTER (EMERGENCY)
Facility: HOSPITAL | Age: 54
Discharge: HOME | End: 2024-05-27
Attending: EMERGENCY MEDICINE
Payer: COMMERCIAL

## 2024-05-27 VITALS
TEMPERATURE: 98.1 F | BODY MASS INDEX: 31.59 KG/M2 | DIASTOLIC BLOOD PRESSURE: 88 MMHG | SYSTOLIC BLOOD PRESSURE: 158 MMHG | HEART RATE: 70 BPM | WEIGHT: 172.7 LBS | RESPIRATION RATE: 18 BRPM | OXYGEN SATURATION: 96 %

## 2024-05-27 DIAGNOSIS — H92.01 RIGHT EAR PAIN: Primary | ICD-10-CM

## 2024-05-27 PROCEDURE — 99283 EMERGENCY DEPT VISIT LOW MDM: CPT

## 2024-05-27 PROCEDURE — 99282 EMERGENCY DEPT VISIT SF MDM: CPT

## 2024-05-27 RX ORDER — LORATADINE 10 MG/1
10 TABLET ORAL DAILY
Qty: 20 TABLET | Refills: 0 | Status: SHIPPED | OUTPATIENT
Start: 2024-05-27

## 2024-05-27 RX ORDER — ACETAMINOPHEN 325 MG/1
650 TABLET ORAL ONCE
Status: COMPLETED | OUTPATIENT
Start: 2024-05-27 | End: 2024-05-27

## 2024-05-27 RX ADMIN — ACETAMINOPHEN 650 MG: 325 TABLET ORAL at 15:32

## 2024-05-27 NOTE — DISCHARGE INSTRUCTIONS
Continue using allergy medication daily as well as nose spray prescribed by ENT. Follow-up with ENT and PCP within the week. Return to ED for any worsening symptoms.

## 2024-05-27 NOTE — ED PROVIDER NOTES
History  Chief Complaint   Patient presents with    Earache     R ear pain 3 weeks has been using a saline nasal rinse for congestion     Patient is a 53-year-old female with a past medical history including hypothyroidism. Presents today for a chief complaint of right ear pain for 3 weeks. Was seen by ENT on 04/18 and was prescribed an oral steroid taper, an antibiotic, budesonide rinses,  and continue nasal steroid spray for nasal polyps. States that, for the last 3 weeks, she has the sensation that her ear is blocked and hearing is muffled. Denies any injury to ear. Denies any other symptoms. Per patient, she has stopped taking loratadine.       Earache  Associated symptoms: no fever        Prior to Admission Medications   Prescriptions Last Dose Informant Patient Reported? Taking?   B Complex CAPS   Yes No   Sig: Take 1 capsule by mouth in the morning.   Diclofenac Sodium (VOLTAREN) 1 %   Yes No   Sig: Apply 1 application topically 4 (four) times a day   HYDROcodone-acetaminophen (NORCO) 5-325 mg per tablet   No No   Sig: Take 1 tablet by mouth every 4 (four) hours as needed for pain for up to 20 dosesMax Daily Amount: 6 tablets   Phenir-PE-Sod Sal-Caff Cit (COUGH/COLD MEDICINE PO)   Yes No   Sig: Take 30 mL by mouth as needed   calcium citrate-vitamin D (CITRACAL+D) 315-200 MG-UNIT per tablet   Yes No   Sig: Take 1 tablet by mouth in the morning.   calcium-vitamin D 250-100 MG-UNIT per tablet   Yes No   Sig: Take 1 tablet by mouth 2 (two) times a day   clobetasol (TEMOVATE) 0.05 % cream   Yes No   Sig: apply to affected area twice a day for 10 days   cyanocobalamin 1,000 mcg/mL   Yes No   Sig: Inject 1,000 mcg into a muscle every 30 (thirty) days   ergocalciferol (VITAMIN D2) 50,000 units   Yes No   Sig: Take 50,000 Units by mouth once a week   famotidine (PEPCID) 20 mg tablet   No No   Sig: Take 1 tablet (20 mg total) by mouth 2 (two) times a day   Patient not taking: Reported on 2/11/2021   fluticasone  (FLONASE) 50 mcg/act nasal spray   No No   Si sprays into each nostril 2 (two) times a day   Patient not taking: Reported on 2022   fluticasone (FLONASE) 50 mcg/act nasal spray   No No   Si sprays into each nostril 2 (two) times a day   Patient not taking: Reported on 2022   fluticasone (FLONASE) 50 mcg/act nasal spray   No No   Sig: INSTILL 2 SPRAYS INTO EACH NOSTRIL IN THE MORNING AND EVENING   fluticasone (FLONASE) 50 mcg/act nasal spray   No No   Si sprays into each nostril 2 (two) times a day   gabapentin (NEURONTIN) 100 mg capsule   Yes No   levothyroxine 50 mcg tablet   No No   Sig: take 1 tablet by mouth every morning ON AN EMPTY STOMACH   loratadine (CLARITIN) 10 mg tablet  Self Yes No   Sig: Take 10 mg by mouth daily as needed     Patient not taking: Reported on 3/14/2024   loratadine (CLARITIN) 10 mg tablet   No No   Sig: Take 1 tablet (10 mg total) by mouth daily   Patient not taking: Reported on 2022   loratadine (CLARITIN) 10 mg tablet   No No   Sig: Take 1 tablet (10 mg total) by mouth in the morning.   melatonin 3 mg   Yes No   Sig: Take 3 mg by mouth daily at bedtime   montelukast (SINGULAIR) 10 mg tablet   Yes No   Sig: Take 10 mg by mouth daily at bedtime   Patient not taking: Reported on 2022   montelukast (SINGULAIR) 10 mg tablet   No No   Sig: Take 1 tablet (10 mg total) by mouth daily at bedtime   predniSONE 10 mg tablet   No No   Si tabls PO in morning  days 1-3, 4 tabls days 4-6, 3 tabls PO days 7-9, 2 tabls days 10-12, 1 tabl PO in morning  days -15   Patient not taking: Reported on 2021   predniSONE 10 mg tablet   No No   Sig: Take 5 tablets (50 mg total) by mouth daily after breakfast 5 tabls PO in morning  days 1-3, 4 tabl days 4-6, 3 tabls days 7-9, 2 tabls days 10-12, 1 tabl PO in morning  days 13-15   predniSONE 10 mg tablet   No No   Sig: Take 5 tablets (50 mg total) by mouth daily after breakfast 5 tabls PO in morning  days 1-3, 4 tabl days  4-6, 3 tabls days 7-9, 2 tabls days 10-12, 1 tabl PO in morning  days 13-15   sodium chloride (OCEAN) 0.65 % nasal spray   No No   Si sprays into each nostril 60 minutes pre-procedure   thiamine (VITAMIN B1) 100 mg tablet   Yes No   Sig: Take 100 mg by mouth daily      Facility-Administered Medications: None       Past Medical History:   Diagnosis Date    Hypothyroidism     Obesity     Vitamin D deficiency        Past Surgical History:   Procedure Laterality Date     SECTION  01/10/2000    NASAL SINUS SURGERY      [place] STRTCTC CPTR ASSTD PX EXTRADURAL CRANIAL N/A 2020    Procedure: FUNCTIONAL ENDOSCOPIC SINUS SURGERY (FESS) IMAGED GUIDED, MAXILLARY ANTROSTOMY, TOTAL ETHMOIDECTOMY, SPHENOIDOTOMY,FRONTAL SINUSOTOMY;  Surgeon: Jatin Mejia MD;  Location: [facility];  Service: ENT    TUBAL LIGATION Bilateral        Family History   Problem Relation Age of Onset    Prostate cancer Father     Hypertension Mother     Diabetes Mother     Heart disease Mother     No Known Problems Sister     No Known Problems Sister     No Known Problems Sister     No Known Problems Sister      I have reviewed and agree with the history as documented.    E-Cigarette/Vaping    E-Cigarette Use Never User      E-Cigarette/Vaping Substances    Nicotine No     THC No     CBD No     Flavoring No     Other No     Unknown No      Social History     Tobacco Use    Smoking status: Never    Smokeless tobacco: Never   Vaping Use    Vaping status: Never Used   Substance Use Topics    Alcohol use: Never    Drug use: Never       Review of Systems   Constitutional:  Negative for chills and fever.   HENT:  Positive for ear pain.    Respiratory:  Negative for chest tightness and shortness of breath.    Cardiovascular:  Negative for chest pain and palpitations.   All other systems reviewed and are negative.      Physical Exam  Physical Exam  Vitals and nursing note reviewed.   Constitutional:       Appearance: Normal appearance.   HENT:       Head: Normocephalic and atraumatic.      Right Ear: Tympanic membrane, ear canal and external ear normal. No drainage or tenderness. No middle ear effusion. No mastoid tenderness. Tympanic membrane is not erythematous, retracted or bulging.      Left Ear: Tympanic membrane, ear canal and external ear normal. No drainage or tenderness.  No middle ear effusion. No mastoid tenderness. Tympanic membrane is not erythematous, retracted or bulging.      Mouth/Throat:      Mouth: Mucous membranes are moist.      Pharynx: Oropharynx is clear.   Eyes:      Extraocular Movements: Extraocular movements intact.      Conjunctiva/sclera: Conjunctivae normal.      Pupils: Pupils are equal, round, and reactive to light.   Pulmonary:      Effort: Pulmonary effort is normal.   Musculoskeletal:         General: Normal range of motion.   Skin:     General: Skin is warm and dry.      Capillary Refill: Capillary refill takes less than 2 seconds.   Neurological:      General: No focal deficit present.      Mental Status: She is alert and oriented to person, place, and time.   Psychiatric:         Mood and Affect: Mood normal.         Behavior: Behavior normal.         Vital Signs  ED Triage Vitals   Temperature Pulse Respirations Blood Pressure SpO2   05/27/24 1455 05/27/24 1455 05/27/24 1455 05/27/24 1455 05/27/24 1455   98.1 °F (36.7 °C) 70 18 158/88 96 %      Temp Source Heart Rate Source Patient Position - Orthostatic VS BP Location FiO2 (%)   05/27/24 1455 05/27/24 1455 05/27/24 1455 05/27/24 1455 --   Oral Monitor Sitting Right arm       Pain Score       05/27/24 1532       7           Vitals:    05/27/24 1455   BP: 158/88   Pulse: 70   Patient Position - Orthostatic VS: Sitting         Visual Acuity      ED Medications  Medications   acetaminophen (TYLENOL) tablet 650 mg (650 mg Oral Given 5/27/24 1532)       Diagnostic Studies  Results Reviewed       None                   No orders to display              Procedures  Procedures          ED Course                                             Medical Decision Making  Patient is a 53-year-old female presenting for ear pain x3 weeks. No acute findings such as otitis media, otitis externa, or mastoiditis were noted on examination. While reviewing medications with patient, patient states that she has not been taking the prescribed loratadine. Discussed with patient that she should resume taking medication as it will help decrease any built-up congestion and pressure. Prescription placed at this time. Encouraged follow-up with PCP and ENT within the week. Strict ED return precautions given. Patient verbalized understanding of discharge instructions and follow-up care at this time.             Disposition  Final diagnoses:   Right ear pain     Time reflects when diagnosis was documented in both MDM as applicable and the Disposition within this note       Time User Action Codes Description Comment    5/27/2024  3:11 PM Magalie Queen Add [H92.01] Right ear pain           ED Disposition       ED Disposition   Discharge    Condition   Stable    Date/Time   Mon May 27, 2024  3:11 PM    Comment   Dinorah Sheriff discharge to home/self care.                   Follow-up Information       Follow up With Specialties Details Why Contact Info Additional Information    [facility] Emergency Medicine  If symptoms worsen [address] [facility] [address] [phone] [facility]            Discharge Medication List as of 5/27/2024  3:22 PM        CONTINUE these medications which have NOT CHANGED    Details   B Complex CAPS Take 1 capsule by mouth in the morning., Starting Wed 3/23/2022, Historical Med      calcium citrate-vitamin D (CITRACAL+D) 315-200 MG-UNIT per tablet Take 1 tablet by mouth in the morning., Starting Thu 3/24/2022, Historical Med      calcium-vitamin D 250-100 MG-UNIT per tablet Take 1  tablet by mouth 2 (two) times a day, Historical Med      clobetasol (TEMOVATE) 0.05 % cream apply to affected area twice a day for 10 days, Historical Med      cyanocobalamin 1,000 mcg/mL Inject 1,000 mcg into a muscle every 30 (thirty) days, Starting Thu 3/24/2022, Until Tue 9/20/2022, Historical Med      Diclofenac Sodium (VOLTAREN) 1 % Apply 1 application topically 4 (four) times a day, Starting Tue 9/7/2021, Until Wed 9/7/2022, Historical Med      ergocalciferol (VITAMIN D2) 50,000 units Take 50,000 Units by mouth once a week, Starting Wed 3/23/2022, Historical Med      famotidine (PEPCID) 20 mg tablet Take 1 tablet (20 mg total) by mouth 2 (two) times a day, Starting Thu 11/5/2020, Normal      !! fluticasone (FLONASE) 50 mcg/act nasal spray 2 sprays into each nostril 2 (two) times a day, Starting Thu 8/20/2020, Normal      !! fluticasone (FLONASE) 50 mcg/act nasal spray 2 sprays into each nostril 2 (two) times a day, Starting Thu 2/11/2021, Normal      !! fluticasone (FLONASE) 50 mcg/act nasal spray INSTILL 2 SPRAYS INTO EACH NOSTRIL IN THE MORNING AND EVENING, Normal      !! fluticasone (FLONASE) 50 mcg/act nasal spray 2 sprays into each nostril 2 (two) times a day, Starting Thu 3/14/2024, Normal      gabapentin (NEURONTIN) 100 mg capsule Starting Mon 5/16/2022, Historical Med      HYDROcodone-acetaminophen (NORCO) 5-325 mg per tablet Take 1 tablet by mouth every 4 (four) hours as needed for pain for up to 20 dosesMax Daily Amount: 6 tablets, Starting Mon 1/27/2020, Normal      levothyroxine 50 mcg tablet take 1 tablet by mouth every morning ON AN EMPTY STOMACH, Normal      !! loratadine (CLARITIN) 10 mg tablet Take 10 mg by mouth daily as needed  , Starting Tue 2/14/2017, Historical Med      !! loratadine (CLARITIN) 10 mg tablet Take 1 tablet (10 mg total) by mouth daily, Starting Thu 8/20/2020, Normal      !! loratadine (CLARITIN) 10 mg tablet Take 1 tablet (10 mg total) by mouth in the morning., Starting  Thu 5/19/2022, Normal      melatonin 3 mg Take 3 mg by mouth daily at bedtime, Starting Wed 3/23/2022, Historical Med      !! montelukast (SINGULAIR) 10 mg tablet Take 10 mg by mouth daily at bedtime, Historical Med      !! montelukast (SINGULAIR) 10 mg tablet Take 1 tablet (10 mg total) by mouth daily at bedtime, Starting Thu 5/19/2022, Normal      Phenir-PE-Sod Sal-Caff Cit (COUGH/COLD MEDICINE PO) Take 30 mL by mouth as needed, Historical Med      !! predniSONE 10 mg tablet 5 tabls PO in morning  days 1-3, 4 tabls days 4-6, 3 tabls PO days 7-9, 2 tabls days 10-12, 1 tabl PO in morning  days 13-15, Normal      !! predniSONE 10 mg tablet Take 5 tablets (50 mg total) by mouth daily after breakfast 5 tabls PO in morning  days 1-3, 4 tabl days 4-6, 3 tabls days 7-9, 2 tabls days 10-12, 1 tabl PO in morning  days 13-15, Starting Thu 5/19/2022, Normal      !! predniSONE 10 mg tablet Take 5 tablets (50 mg total) by mouth daily after breakfast 5 tabls PO in morning  days 1-3, 4 tabl days 4-6, 3 tabls days 7-9, 2 tabls days 10-12, 1 tabl PO in morning  days 13-15, Starting Thu 3/14/2024, Normal      sodium chloride (OCEAN) 0.65 % nasal spray 2 sprays into each nostril 60 minutes pre-procedure, Starting Mon 1/27/2020, Until Wed 2/26/2020, Normal      thiamine (VITAMIN B1) 100 mg tablet Take 100 mg by mouth daily, Historical Med       !! - Potential duplicate medications found. Please discuss with provider.              PDMP Review         Value Time User    PDMP Reviewed  Yes 1/27/2020 11:04 AM Jatin Mejia MD            ED Provider  Electronically Signed by             CATY Cramer  05/27/24 1472

## 2024-08-01 ENCOUNTER — OFFICE VISIT (OUTPATIENT)
Dept: AUDIOLOGY | Facility: CLINIC | Age: 54
End: 2024-08-01
Payer: COMMERCIAL

## 2024-08-01 ENCOUNTER — OFFICE VISIT (OUTPATIENT)
Dept: OTOLARYNGOLOGY | Facility: CLINIC | Age: 54
End: 2024-08-01
Payer: COMMERCIAL

## 2024-08-01 DIAGNOSIS — R43.8 HYPOSMIA: ICD-10-CM

## 2024-08-01 DIAGNOSIS — J33.9 NASAL POLYPOSIS: ICD-10-CM

## 2024-08-01 DIAGNOSIS — J30.1 SEASONAL ALLERGIC RHINITIS DUE TO POLLEN: ICD-10-CM

## 2024-08-01 DIAGNOSIS — H93.8X1 CLOGGED EAR, RIGHT: Primary | ICD-10-CM

## 2024-08-01 DIAGNOSIS — H92.01 RIGHT EAR PAIN: ICD-10-CM

## 2024-08-01 DIAGNOSIS — H91.90 SUBJECTIVE HEARING LOSS: ICD-10-CM

## 2024-08-01 DIAGNOSIS — H90.5 SENSORINEURAL HEARING LOSS (SNHL) OF RIGHT EAR, UNSPECIFIED HEARING STATUS ON CONTRALATERAL SIDE: Primary | ICD-10-CM

## 2024-08-01 DIAGNOSIS — J32.4 CHRONIC PANSINUSITIS: ICD-10-CM

## 2024-08-01 PROCEDURE — 99213 OFFICE O/P EST LOW 20 MIN: CPT | Performed by: OTOLARYNGOLOGY

## 2024-08-01 PROCEDURE — 92567 TYMPANOMETRY: CPT | Performed by: AUDIOLOGIST

## 2024-08-01 PROCEDURE — 31231 NASAL ENDOSCOPY DX: CPT | Performed by: OTOLARYNGOLOGY

## 2024-08-01 PROCEDURE — 92557 COMPREHENSIVE HEARING TEST: CPT | Performed by: AUDIOLOGIST

## 2024-08-01 NOTE — PROGRESS NOTES
AUDIOLOGY AUDIOMETRIC EVALUATION      Name:  Dinorah Sheriff  :  1970  Age:  53 y.o.  Date of Evaluation: 2024    History: Hearing Check  Reason for visit:  Ms. Sheriff is seen today at the request of Dr. Jatin Mejia for an evaluation of hearing.          EVALUATION RESULTS:         Audiogram Description:      Mild Sensorineural HL AD / Mild Unspecified HL AS        Impedance Audiometry:     Tympanometry     Type Vol Press Comp   R A 1.1 ml -15 daPa 0.3 ml   L A 1.0 ml -75 daPa 0.3 ml                                    Speech Audiometry:     Right Ear:  SRT: 20dB                     WRS was excellent (100%) at 50dB presentation level     Left Ear:  SRT: 15dB         WRS was excellent (100%) at 50dB presentation level           Test-Retest Reliability: Good  PT Stimulus: Puretone  Speech Stimulus: Recorded  Recognition Test: [place] List C,D  Response: Push Button  Transducer: Headphones           FOLLOW-UP  Patient to follow-up with provider afterwards for review.        Peyman Magaña.  Licensed Audiologist

## 2024-08-01 NOTE — PROGRESS NOTES
Dinorah Sheriff 53 y.o. female MRN: [id number]  Unit/Bed#:  Encounter: [id number]            History of Present Illness     Reason for Visit:: History of nasal polyposis/sinusitis    HPI: Dinorah Sheriff is a 53 y.o. year old female  status post functional endoscopic sinus surgery image guided all sinuses (1/19/17 and 01/27/2020).   Patient had initially recovered a fair amount of sense smell but 7/2022 visit had developed mild hyposmia.  I gave her a new treatment with oral antibiotic, oral steroid taper and instructed to continue the fluticasone b.i.d. and budesonide sinus rinses with partial recovery of sense of smell.  On last visit very small polyps were noticed on the roof of the ethmoid.  She was then instructed to continue the budesonide rinses.  she has not followed since 7/14/2022.  On 12/25/2023 she had apparently conjunctivitis and preseptal cellulitis, was seen in the emergency room and a CT scan of the orbits was done.  Left periorbital preseptal soft tissue edema was noticed, in addition to complete opacification of all the sinuses.  She did not tolerate the antibiotic that was prescribed orally, was treated only with eyedrops  She returned to our office on March 2024 with nasal congestion and anosmia, at that point she was noticed to have polyps on her nasal cavities.  I prescribed an oral steroid taper, a course of antibiotics and instructed patient to start budesonide rinses and continue nasal steroid spray.  She reports that with the treatment there was some improvement with recovery of sense of smell and less congestion.  A new CT scan was requested.    08/01/2024: Following up for chronic sinusitis to review new CT scan results. Using saline rinses caused her right ear to feel clogged. She endorses right otalgia and hearing loss, denies otorrhea. This has been ongoing for about 3 months. PCP treated with steroids for 5 days w/o improvement. Continues to endorse hyposmia. No hx of  asthma.      Review of Systems   All other systems reviewed and are negative.    Revision of Systems:    Complete review done, only positive for the symptoms described in the H&P section above      Historical Information   Past Medical History:   Diagnosis Date    Hypothyroidism     Obesity     Vitamin D deficiency      Past Surgical History:   Procedure Laterality Date     SECTION  01/10/2000    NASAL SINUS SURGERY      [place] STRTCTC CPTR ASSTD PX EXTRADURAL CRANIAL N/A 2020    Procedure: FUNCTIONAL ENDOSCOPIC SINUS SURGERY (FESS) IMAGED GUIDED, MAXILLARY ANTROSTOMY, TOTAL ETHMOIDECTOMY, SPHENOIDOTOMY,FRONTAL SINUSOTOMY;  Surgeon: Jatin Mejia MD;  Location: [facility];  Service: ENT    TUBAL LIGATION Bilateral      Social History   Social History     Substance and Sexual Activity   Alcohol Use Never     Social History     Substance and Sexual Activity   Drug Use Never     Social History     Tobacco Use   Smoking Status Never   Smokeless Tobacco Never     Family History:   Family History   Problem Relation Age of Onset    Prostate cancer Father     Hypertension Mother     Diabetes Mother     Heart disease Mother     No Known Problems Sister     No Known Problems Sister     No Known Problems Sister     No Known Problems Sister        Meds/Allergies   No current facility-administered medications for this visit.         Allergies   Allergen Reactions    Cephalexin Rash       Objective       Physical Exam   not currently breastfeeding.  Constitutional: Oriented to person, place, and time. Well-developed and well-nourished, no apparent distress, non-toxic appearance. Cooperative, able to hear and answer questions without difficulty.      Voice: Normal voice quality.  Head: Normocephalic, atraumatic.  No scars, masses or lesions.  Face: Symmetric, no edema, no sinus tenderness.  Eyes: Vision grossly intact, extra-ocular movement intact.  Right Ear: External ear normal.  Auditory canal clear.  Tympanic  membrane well-appearing, without retraction or scarring.  No fluid present. No post-auricular erythema or tenderness  Left Ear: External ear normal.  Auditory canal clear.  Tympanic membrane well-appearing, without retraction or scarring.  No fluid present.  No post-auricular erythema or tenderness.  Nose: Septum midline, Mucosa moist, turbinates normal size, no edema. No polyps are evident, no discharge   Oral cavity:  Lips normal, no mucosal lesions. Dentition intact, gingiva normal in appearance. Mucosa moist,  Tongue mobile, floor of mouth normal.  Hard palate unremarkable.  No masses or lesions.   Oropharynx: Uvula is midline, sot palate normal.  Unremarkable oropharyngeal inlet.  Tonsils unremarkable.  Posterior pharyngeal wall clear. No masses or lesions.  Salivary glands:  Parotid glands and submandibular glands symmetric, no enlargement or tenderness.  Neck: Normal laryngeal elevation with swallow.  Trachea midline.  No masses or lesions. No palpable adenopathy.  Thyroid: normal in size, and consistency, unremarkable without tenderness or palpable nodules.  Pulmonary/Chest: Normal effort and rate. No respiratory distress.   Musculoskeletal: Normal range of motion.   Neurological: Cranial nerves 2-12 intact.  Skin: Skin is warm and dry.   Psychiatric: Normal mood and affect.    CT scan orbits: 12/25/2023  Complete opacification of all the sinuses, nasal cavity still patent, there are evident changes of prior sinus surgery.  No erosion of the bone on the lamina Papyracea or base of skull.  CT scan sinuses 3/22/2024 (after steroid taper and antibiotic) status post sinus surgery with significant improvement compared to the CT scan from December 2023.  There is still mucosal thickening on maxillary sinuses, more polypoid tissue on the right side compared to the left, in a similar fashion the right frontal sinus has more polypoid edema or opacification near the outflow track as compared to the left frontal  "sinus.  CT sinus 03/22/2024: Pansinus mucosal thickening identified with polypoid mucosal thickening noted. Although there is extensive opacification, there is improved appearance since the prior CT of December 25, 2023. Frothy secretions left frontal and right maxillary sinus may represent acute sinusitis.    Lab Results: CBC: No results found for: \"WBC\", \"HGB\", \"HCT\", \"MCV\", \"PLT\", \"ADJUSTEDWBC\", \"RBC\", \"MCH\", \"MCHC\", \"RDW\", \"MPV\", \"NRBC\", CMP: No results found for: \"NA\", \"K\", \"CL\", \"CO2\", \"ANIONGAP\", \"BUN\", \"CREATININE\", \"GLUCOSE\", \"CALCIUM\", \"AST\", \"ALT\", \"ALKPHOS\", \"PROT\", \"BILITOT\", \"EGFR\"    Nasal endoscopy:     Verbal informed consent obtained  Topical anesthesia and vasoconstriction was applied via nasal spray bilaterally. Once anesthesia vasoconstriction had taken effect a flexible endoscope was advanced on both nasal cavities to the level of the nasopharynx with the findings described below:      The endoscope was then removed without complication patient tolerates procedure.  Endoscopy reveals nasal cavity is completely patent, endoscope was advanced on left middle meatus into the ethmoid cavity.  Most of the ethmoid cavity has relatively normal-appearing mucosa, there is some polypoid mucosa towards the medial side of the ethmoid.  Also some polypoid mucosa can be seen on the maxillary sinus.  No active discharge noticed at time of the exam.  Endoscope was then advanced on the right nasal cavity.  No polyps into the nasal cavity.  The endoscope was advanced behind the middle turbinate then the ethmoid is seen partially filled with multiple 4 mm polyps towards the roof most significantly.  There is no active discharge, no crusting, polyps also present at the area of the antrostomy as well as towards the frontal recess area.    Audiogram completed today 08/01/2024: normal    Assessment:  1. Clogged ear, right        2. Right ear pain  Ambulatory Referral to Otolaryngology      3. Chronic pansinusitis      "   4. Seasonal allergic rhinitis due to pollen        5. Nasal polyposis        6. Hyposmia        7. Subjective hearing loss            Recurrence of polyps on patient with 2 prior surgeries, 2017 and 2020.  I did review with the patient the nature of the polyposis being it a very chronic condition that needs continued follow-up.  Continue mometasone sinus rinses BID, have not improved polyps but have prevented progression.  She may qualify for Dupixent or Nucala, as repeated surgeries will offer limited long-term benefit. Will begin paperwork    Audiogram and otologic exam normal

## 2024-09-05 DIAGNOSIS — J30.1 SEASONAL ALLERGIC RHINITIS DUE TO POLLEN: ICD-10-CM

## 2024-09-05 DIAGNOSIS — R43.8 HYPOSMIA: ICD-10-CM

## 2024-09-05 DIAGNOSIS — J32.4 CHRONIC PANSINUSITIS: ICD-10-CM

## 2024-09-05 DIAGNOSIS — J33.9 NASAL POLYPOSIS: ICD-10-CM

## 2024-09-05 RX ORDER — FLUTICASONE PROPIONATE 50 MCG
SPRAY, SUSPENSION (ML) NASAL
Qty: 32 G | Refills: 2 | Status: SHIPPED | OUTPATIENT
Start: 2024-09-05

## 2024-10-04 DIAGNOSIS — R43.8 HYPOSMIA: ICD-10-CM

## 2024-10-04 DIAGNOSIS — J33.9 NASAL POLYPOSIS: ICD-10-CM

## 2024-10-04 DIAGNOSIS — J32.4 CHRONIC PANSINUSITIS: ICD-10-CM

## 2024-10-04 DIAGNOSIS — J30.1 SEASONAL ALLERGIC RHINITIS DUE TO POLLEN: ICD-10-CM

## 2024-10-07 RX ORDER — FLUTICASONE PROPIONATE 50 MCG
SPRAY, SUSPENSION (ML) NASAL
Qty: 96 ML | Refills: 2 | Status: SHIPPED | OUTPATIENT
Start: 2024-10-07